# Patient Record
Sex: FEMALE | Race: WHITE | NOT HISPANIC OR LATINO | URBAN - METROPOLITAN AREA
[De-identification: names, ages, dates, MRNs, and addresses within clinical notes are randomized per-mention and may not be internally consistent; named-entity substitution may affect disease eponyms.]

---

## 2017-04-06 ENCOUNTER — ANESTHESIA - HEALTHEAST (OUTPATIENT)
Dept: SURGERY | Facility: HOSPITAL | Age: 27
End: 2017-04-06

## 2017-04-06 ASSESSMENT — MIFFLIN-ST. JEOR: SCORE: 1330.18

## 2017-04-07 ENCOUNTER — SURGERY - HEALTHEAST (OUTPATIENT)
Dept: SURGERY | Facility: HOSPITAL | Age: 27
End: 2017-04-07

## 2017-04-07 ASSESSMENT — MIFFLIN-ST. JEOR: SCORE: 1327.74

## 2017-04-26 ASSESSMENT — MIFFLIN-ST. JEOR: SCORE: 1301.95

## 2017-04-28 ENCOUNTER — ANESTHESIA - HEALTHEAST (OUTPATIENT)
Dept: SURGERY | Facility: CLINIC | Age: 27
End: 2017-04-28

## 2017-04-28 ENCOUNTER — SURGERY - HEALTHEAST (OUTPATIENT)
Dept: SURGERY | Facility: CLINIC | Age: 27
End: 2017-04-28

## 2018-03-15 ENCOUNTER — RECORDS - HEALTHEAST (OUTPATIENT)
Dept: LAB | Facility: CLINIC | Age: 28
End: 2018-03-15

## 2018-03-17 LAB — BACTERIA SPEC CULT: ABNORMAL

## 2019-04-12 ASSESSMENT — MIFFLIN-ST. JEOR: SCORE: 1367.72

## 2019-04-15 ENCOUNTER — ANESTHESIA - HEALTHEAST (OUTPATIENT)
Dept: SURGERY | Facility: AMBULATORY SURGERY CENTER | Age: 29
End: 2019-04-15

## 2019-04-16 ENCOUNTER — SURGERY - HEALTHEAST (OUTPATIENT)
Dept: SURGERY | Facility: AMBULATORY SURGERY CENTER | Age: 29
End: 2019-04-16

## 2019-04-16 ASSESSMENT — MIFFLIN-ST. JEOR: SCORE: 1367.72

## 2019-09-04 ENCOUNTER — RECORDS - HEALTHEAST (OUTPATIENT)
Dept: LAB | Facility: CLINIC | Age: 29
End: 2019-09-04

## 2019-09-06 LAB — BACTERIA SPEC CULT: ABNORMAL

## 2020-03-25 ENCOUNTER — RECORDS - HEALTHEAST (OUTPATIENT)
Dept: LAB | Facility: CLINIC | Age: 30
End: 2020-03-25

## 2020-03-25 LAB — TSH SERPL DL<=0.005 MIU/L-ACNC: 1.71 UIU/ML (ref 0.3–5)

## 2020-12-06 ENCOUNTER — RECORDS - HEALTHEAST (OUTPATIENT)
Dept: LAB | Facility: CLINIC | Age: 30
End: 2020-12-06

## 2020-12-07 LAB — BACTERIA SPEC CULT: NO GROWTH

## 2021-02-04 ENCOUNTER — RECORDS - HEALTHEAST (OUTPATIENT)
Dept: LAB | Facility: CLINIC | Age: 31
End: 2021-02-04

## 2021-02-04 LAB
ERYTHROCYTE [DISTWIDTH] IN BLOOD BY AUTOMATED COUNT: 13.4 % (ref 11–14.5)
HCT VFR BLD AUTO: 43.7 % (ref 35–47)
HGB BLD-MCNC: 14.1 G/DL (ref 12–16)
MCH RBC QN AUTO: 29.3 PG (ref 27–34)
MCHC RBC AUTO-ENTMCNC: 32.3 G/DL (ref 32–36)
MCV RBC AUTO: 91 FL (ref 80–100)
PLATELET # BLD AUTO: 338 THOU/UL (ref 140–440)
PMV BLD AUTO: 10.4 FL (ref 8.5–12.5)
RBC # BLD AUTO: 4.81 MILL/UL (ref 3.8–5.4)
WBC: 7.5 THOU/UL (ref 4–11)

## 2021-02-20 ENCOUNTER — RECORDS - HEALTHEAST (OUTPATIENT)
Dept: LAB | Facility: CLINIC | Age: 31
End: 2021-02-20

## 2021-02-20 LAB
SARS-COV-2 PCR COMMENT: NORMAL
SARS-COV-2 RNA SPEC QL NAA+PROBE: NEGATIVE
SARS-COV-2 VIRUS SPECIMEN SOURCE: NORMAL

## 2021-05-27 NOTE — ANESTHESIA CARE TRANSFER NOTE
Last vitals:   Vitals:    04/16/19 1233   BP: 119/79   Pulse: 99   Resp: 15   Temp: 36.4  C (97.6  F)   SpO2:      Pt brought to PACU on 10L facemask. Monitors applied. VSS upon arrival.  SpO2 100%    Patient's level of consciousness is drowsy  Spontaneous respirations: yes  Maintains airway independently: yes  Dentition unchanged: yes  Oropharynx: oropharynx clear of all foreign objects    QCDR Measures:  ASA# 20 - Surgical Safety Checklist: WHO surgical safety checklist completed prior to induction    PQRS# 430 - Adult PONV Prevention: 4558F - Pt received => 2 anti-emetic agents (different classes) preop & intraop  ASA# 8 - Peds PONV Prevention: NA - Not pediatric patient, not GA or 2 or more risk factors NOT present  PQRS# 424 - Hetal-op Temp Management: 4559F - At least one body temp DOCUMENTED => 35.5C or 95.9F within required timeframe  PQRS# 426 - PACU Transfer Protocol: - Transfer of care checklist used  ASA# 14 - Acute Post-op Pain: ASA14B - Patient did NOT experience pain >= 7 out of 10

## 2021-05-27 NOTE — ANESTHESIA PREPROCEDURE EVALUATION
Anesthesia Evaluation      History of anesthetic complications: PONV.     Airway   Mallampati: II  Neck ROM: full   Pulmonary - negative ROS and normal exam                          Cardiovascular - negative ROS and normal exam   Neuro/Psych    (+) depression, anxiety/panic attacks,     Endo/Other - negative ROS      GI/Hepatic/Renal - negative ROS           Dental - normal exam                        Anesthesia Plan  Planned anesthetic: general endotracheal    ASA 2     Anesthetic plan and risks discussed with: patient    Post-op plan: routine recovery

## 2021-05-27 NOTE — ANESTHESIA POSTPROCEDURE EVALUATION
Patient: Mary Morris  DIAGNOSTIC LAPAROSCOPY, POSSIBLE TREATMENT OF ENDOMETRIOSIS  Anesthesia type: general    Patient location: PACU  Last vitals:   Vitals Value Taken Time   /71 4/16/2019  2:00 PM   Temp  4/16/2019  3:33 PM   Pulse 61 4/16/2019  2:00 PM   Resp 16 4/16/2019  2:00 PM   SpO2 98 % 4/16/2019  2:00 PM     Post vital signs: stable  Level of consciousness: awake and responds to simple questions  Post-anesthesia pain: pain controlled  Post-anesthesia nausea and vomiting: no  Pulmonary: unassisted, return to baseline  Cardiovascular: stable and blood pressure at baseline  Hydration: adequate  Anesthetic events: no    QCDR Measures:  ASA# 11 - Hetal-op Cardiac Arrest: ASA11B - Patient did NOT experience unanticipated cardiac arrest  ASA# 12 - Hetal-op Mortality Rate: ASA12B - Patient did NOT die  ASA# 13 - PACU Re-Intubation Rate: ASA13B - Patient did NOT require a new airway mgmt  ASA# 10 - Composite Anes Safety: ASA10A - No serious adverse event    Additional Notes:

## 2021-05-30 VITALS — WEIGHT: 134 LBS | BODY MASS INDEX: 23.74 KG/M2 | HEIGHT: 63 IN

## 2021-05-30 VITALS — BODY MASS INDEX: 23.4 KG/M2 | HEIGHT: 64 IN | WEIGHT: 137.06 LBS

## 2021-06-02 VITALS — WEIGHT: 145 LBS | HEIGHT: 64 IN | BODY MASS INDEX: 24.75 KG/M2

## 2021-06-09 NOTE — ANESTHESIA CARE TRANSFER NOTE
Last vitals:   Vitals:    04/07/17 0801   BP: 109/73   Pulse: 74   Resp: 16   Temp: 36.4  C (97.6  F)   SpO2: 99%     Patient's level of consciousness is drowsy  Spontaneous respirations: yes  Maintains airway independently: yes  Dentition unchanged: yes  Oropharynx: oropharynx clear of all foreign objects    QCDR Measures:  ASA# 20 - Surgical Safety Checklist: ASA20A - Safety Checks Done  PQRS# 430 - Adult PONV Prevention: NA - Not adult patient, not GA or 3 or more risk factors NOT present  ASA# 8 - Peds PONV Prevention: NA - Not pediatric patient, not GA or 2 or more risk factors NOT present  PQRS# 424 - Hetal-op Temp Management: 4559F - At least one body temp DOCUMENTED => 35.5C or 95.9F within required timeframe  PQRS# 426 - PACU Transfer Protocol: - Transfer of care checklist used  ASA# 14 - Acute Post-op Pain: ASA14B - Patient did NOT experience pain >= 7 out of 10    I completed my SBAR handoff to the receiving nurse per policy and procedure.

## 2021-06-09 NOTE — ANESTHESIA PREPROCEDURE EVALUATION
Anesthesia Evaluation      Patient summary reviewed   No history of anesthetic complications     Airway   Mallampati: I  Neck ROM: full   Pulmonary - negative ROS and normal exam                          Cardiovascular - negative ROS and normal exam   Neuro/Psych - negative ROS     Endo/Other - negative ROS      GI/Hepatic/Renal - negative ROS           Dental                         Anesthesia Plan  Planned anesthetic: MAC    ASA 1     Anesthetic plan and risks discussed with: patient

## 2021-06-09 NOTE — ANESTHESIA POSTPROCEDURE EVALUATION
Patient: Mary Morris  ENDOSCOPIC ULTRASOUND  Anesthesia type: MAC    Patient location: PACU  Last vitals:   Vitals:    04/07/17 0828   BP: 109/70   Pulse: 70   Resp: 16   Temp:    SpO2: 100%     Post vital signs: stable  Level of consciousness: awake and responds to simple questions  Post-anesthesia pain: pain controlled  Post-anesthesia nausea and vomiting: no  Pulmonary: unassisted, return to baseline  Cardiovascular: stable and blood pressure at baseline  Hydration: adequate  Anesthetic events: no    QCDR Measures:  ASA# 11 - Hetal-op Cardiac Arrest: ASA11B - Patient did NOT experience unanticipated cardiac arrest  ASA# 12 - Hetal-op Mortality Rate: ASA12B - Patient did NOT die  ASA# 13 - PACU Re-Intubation Rate: ASA13B - Patient did NOT require a new airway mgmt  ASA# 10 - Composite Anes Safety: ASA10A - No serious adverse event  ASA# 38 - New Corneal Injury: ASA38A - No new exposure keratitis or corneal abrasion in PACU    Additional Notes:

## 2021-06-10 NOTE — ANESTHESIA CARE TRANSFER NOTE
Last vitals:   Vitals:    04/28/17 1146   BP: 119/66   Pulse: (!) 55   Resp: 12   Temp:    SpO2: 100%     Patient's level of consciousness is drowsy  Spontaneous respirations: yes  Maintains airway independently: yes  Dentition unchanged: yes  Oropharynx: oropharynx clear of all foreign objects    QCDR Measures:  ASA# 20 - Surgical Safety Checklist: ASA20A - Safety Checks Done  PQRS# 430 - Adult PONV Prevention: 4558F - Pt received => 2 anti-emetic agents (different classes) preop & intraop  ASA# 8 - Peds PONV Prevention: NA - Not pediatric patient, not GA or 2 or more risk factors NOT present  PQRS# 424 - Hetal-op Temp Management: 4559F - At least one body temp DOCUMENTED => 35.5C or 95.9F within required timeframe  PQRS# 426 - PACU Transfer Protocol: - Transfer of care checklist used  ASA# 14 - Acute Post-op Pain: ASA14B - Patient did NOT experience pain >= 7 out of 10    I completed my SBAR handoff to the receiving nurse per policy and procedure.

## 2021-06-10 NOTE — ANESTHESIA POSTPROCEDURE EVALUATION
Patient: Mary Morris  LAPAROSCOPIC CHOLECYSTECTOMY  Anesthesia type: general    Patient location: Phase II Recovery  Last vitals:   Vitals:    04/28/17 1255   BP:    Pulse:    Resp: 16   Temp:    SpO2:      Post vital signs: stable  Level of consciousness: awake and return to baseline  Post-anesthesia pain: pain controlled  Post-anesthesia nausea and vomiting: no  Pulmonary: unassisted, return to baseline  Cardiovascular: stable and blood pressure at baseline  Hydration: adequate  Anesthetic events: no    QCDR Measures:  ASA# 11 - Hetal-op Cardiac Arrest: ASA11B - Patient did NOT experience unanticipated cardiac arrest  ASA# 12 - Hetal-op Mortality Rate: ASA12B - Patient did NOT die  ASA# 13 - PACU Re-Intubation Rate: ASA13B - Patient did NOT require a new airway mgmt  ASA# 10 - Composite Anes Safety: ASA10A - No serious adverse event  ASA# 38 - New Corneal Injury: ASA38A - No new exposure keratitis or corneal abrasion in PACU    Additional Notes:

## 2021-06-10 NOTE — ANESTHESIA PREPROCEDURE EVALUATION
Anesthesia Evaluation      Patient summary reviewed   History of anesthetic complications: PONV.     Airway   Mallampati: I  Neck ROM: full   Pulmonary - normal exam   (+) asthma  mild,  well controlled,                          Cardiovascular - negative ROS and normal exam  Rhythm: regular  Rate: normal,         Neuro/Psych - negative ROS     Endo/Other - negative ROS      GI/Hepatic/Renal - negative ROS           Dental - normal exam                        Anesthesia Plan  Planned anesthetic: general endotracheal and total IV anesthesia    ASA 2   Induction: intravenous   Anesthetic plan and risks discussed with: patient  Anesthesia plan special considerations: antiemetics,   Post-op plan: routine recovery

## 2023-01-17 ENCOUNTER — LAB REQUISITION (OUTPATIENT)
Dept: LAB | Facility: CLINIC | Age: 33
End: 2023-01-17

## 2023-01-17 DIAGNOSIS — R30.0 DYSURIA: ICD-10-CM

## 2023-01-17 PROCEDURE — 87086 URINE CULTURE/COLONY COUNT: CPT | Performed by: FAMILY MEDICINE

## 2023-01-18 LAB — BACTERIA UR CULT: ABNORMAL

## 2023-02-24 ENCOUNTER — LAB REQUISITION (OUTPATIENT)
Dept: LAB | Facility: CLINIC | Age: 33
End: 2023-02-24

## 2023-02-24 DIAGNOSIS — N20.0 CALCULUS OF KIDNEY: ICD-10-CM

## 2023-02-24 PROCEDURE — 87086 URINE CULTURE/COLONY COUNT: CPT | Performed by: PHYSICIAN ASSISTANT

## 2023-02-26 LAB — BACTERIA UR CULT: NO GROWTH

## 2024-05-13 ENCOUNTER — LAB REQUISITION (OUTPATIENT)
Dept: LAB | Facility: CLINIC | Age: 34
End: 2024-05-13

## 2024-05-13 DIAGNOSIS — Z13.29 ENCOUNTER FOR SCREENING FOR OTHER SUSPECTED ENDOCRINE DISORDER: ICD-10-CM

## 2024-05-13 DIAGNOSIS — Z13.6 ENCOUNTER FOR SCREENING FOR CARDIOVASCULAR DISORDERS: ICD-10-CM

## 2024-05-13 DIAGNOSIS — H57.9 UNSPECIFIED DISORDER OF EYE AND ADNEXA: ICD-10-CM

## 2024-05-13 LAB — ERYTHROCYTE [SEDIMENTATION RATE] IN BLOOD BY WESTERGREN METHOD: 9 MM/HR (ref 0–20)

## 2024-05-13 PROCEDURE — 80053 COMPREHEN METABOLIC PANEL: CPT | Performed by: PHYSICIAN ASSISTANT

## 2024-05-13 PROCEDURE — 86376 MICROSOMAL ANTIBODY EACH: CPT | Performed by: PHYSICIAN ASSISTANT

## 2024-05-13 PROCEDURE — 85652 RBC SED RATE AUTOMATED: CPT | Performed by: PHYSICIAN ASSISTANT

## 2024-05-13 PROCEDURE — 84481 FREE ASSAY (FT-3): CPT | Performed by: PHYSICIAN ASSISTANT

## 2024-05-13 PROCEDURE — 80061 LIPID PANEL: CPT | Performed by: PHYSICIAN ASSISTANT

## 2024-05-13 PROCEDURE — 84480 ASSAY TRIIODOTHYRONINE (T3): CPT | Performed by: PHYSICIAN ASSISTANT

## 2024-05-13 PROCEDURE — 86140 C-REACTIVE PROTEIN: CPT | Performed by: PHYSICIAN ASSISTANT

## 2024-05-13 PROCEDURE — 84439 ASSAY OF FREE THYROXINE: CPT | Performed by: PHYSICIAN ASSISTANT

## 2024-05-13 PROCEDURE — 84443 ASSAY THYROID STIM HORMONE: CPT | Performed by: PHYSICIAN ASSISTANT

## 2024-05-13 PROCEDURE — 84436 ASSAY OF TOTAL THYROXINE: CPT | Performed by: PHYSICIAN ASSISTANT

## 2024-05-14 LAB
ALBUMIN SERPL BCG-MCNC: 4.5 G/DL (ref 3.5–5.2)
ALP SERPL-CCNC: 73 U/L (ref 40–150)
ALT SERPL W P-5'-P-CCNC: 11 U/L (ref 0–50)
ANION GAP SERPL CALCULATED.3IONS-SCNC: 10 MMOL/L (ref 7–15)
AST SERPL W P-5'-P-CCNC: 15 U/L (ref 0–45)
BILIRUB SERPL-MCNC: 0.3 MG/DL
BUN SERPL-MCNC: 10.9 MG/DL (ref 6–20)
CALCIUM SERPL-MCNC: 9.1 MG/DL (ref 8.6–10)
CHLORIDE SERPL-SCNC: 104 MMOL/L (ref 98–107)
CHOLEST SERPL-MCNC: 192 MG/DL
CREAT SERPL-MCNC: 0.81 MG/DL (ref 0.51–0.95)
CRP SERPL-MCNC: <3 MG/L
DEPRECATED HCO3 PLAS-SCNC: 26 MMOL/L (ref 22–29)
EGFRCR SERPLBLD CKD-EPI 2021: >90 ML/MIN/1.73M2
FASTING STATUS PATIENT QL REPORTED: ABNORMAL
FASTING STATUS PATIENT QL REPORTED: ABNORMAL
GLUCOSE SERPL-MCNC: 139 MG/DL (ref 70–99)
HDLC SERPL-MCNC: 65 MG/DL
LDLC SERPL CALC-MCNC: 112 MG/DL
NONHDLC SERPL-MCNC: 127 MG/DL
POTASSIUM SERPL-SCNC: 3.5 MMOL/L (ref 3.4–5.3)
PROT SERPL-MCNC: 7.3 G/DL (ref 6.4–8.3)
SODIUM SERPL-SCNC: 140 MMOL/L (ref 135–145)
T3 SERPL-MCNC: 98 NG/DL (ref 85–202)
T3FREE SERPL-MCNC: 2.8 PG/ML (ref 2–4.4)
T4 FREE SERPL-MCNC: 1.24 NG/DL (ref 0.9–1.7)
T4 SERPL-MCNC: 6.8 UG/DL (ref 4.5–11.7)
THYROPEROXIDASE AB SERPL-ACNC: <10 IU/ML
TRIGL SERPL-MCNC: 74 MG/DL
TSH SERPL DL<=0.005 MIU/L-ACNC: 0.97 UIU/ML (ref 0.3–4.2)

## 2024-06-10 ENCOUNTER — LAB REQUISITION (OUTPATIENT)
Dept: LAB | Facility: CLINIC | Age: 34
End: 2024-06-10

## 2024-06-10 DIAGNOSIS — R73.9 HYPERGLYCEMIA, UNSPECIFIED: ICD-10-CM

## 2024-06-10 LAB — HBA1C MFR BLD: 5.7 %

## 2024-06-10 PROCEDURE — 83036 HEMOGLOBIN GLYCOSYLATED A1C: CPT | Performed by: PHYSICIAN ASSISTANT

## 2024-06-24 ENCOUNTER — LAB REQUISITION (OUTPATIENT)
Dept: LAB | Facility: CLINIC | Age: 34
End: 2024-06-24

## 2024-06-24 PROCEDURE — 87081 CULTURE SCREEN ONLY: CPT | Performed by: FAMILY MEDICINE

## 2024-06-26 LAB — BACTERIA SPEC CULT: NORMAL

## 2024-09-05 ENCOUNTER — LAB REQUISITION (OUTPATIENT)
Dept: LAB | Facility: CLINIC | Age: 34
End: 2024-09-05

## 2024-09-05 DIAGNOSIS — R21 RASH AND OTHER NONSPECIFIC SKIN ERUPTION: ICD-10-CM

## 2024-09-05 DIAGNOSIS — K58.2 MIXED IRRITABLE BOWEL SYNDROME: ICD-10-CM

## 2024-09-05 PROCEDURE — 86003 ALLG SPEC IGE CRUDE XTRC EA: CPT | Performed by: PHYSICIAN ASSISTANT

## 2024-09-05 PROCEDURE — 83516 IMMUNOASSAY NONANTIBODY: CPT | Performed by: PHYSICIAN ASSISTANT

## 2024-09-05 PROCEDURE — 82784 ASSAY IGA/IGD/IGG/IGM EACH: CPT | Performed by: PHYSICIAN ASSISTANT

## 2024-09-06 LAB
GLIADIN IGA SER-ACNC: 0.5 U/ML
GLIADIN IGG SER-ACNC: <0.6 U/ML
IGA SERPL-MCNC: 239 MG/DL (ref 84–499)
TTG IGA SER-ACNC: 0.3 U/ML
TTG IGG SER-ACNC: <0.6 U/ML

## 2024-09-10 LAB
ALMOND IGE QN: <0.1 KU(A)/L
BRAZIL NUT IGE QN: <0.1 KU(A)/L
CASHEW NUT IGE QN: <0.1 KU(A)/L
CHESTNUT IGE QN: <0.1 KU(A)/L
HAZELNUT IGE QN: <0.1 KU(A)/L
PECAN/HICK NUT IGE QN: <0.1 KU(A)/L
PISTACHIO IGE QN: <0.1 KU(A)/L
WALNUT IGE QN: <0.1 KU(A)/L

## 2024-09-11 LAB
ALMOND IGE QN: <0.1 KU(A)/L
CASHEW NUT IGE QN: <0.1 KU(A)/L
CODFISH IGE QN: <0.1 KU(A)/L
COW MILK IGE QN: <0.1 KU(A)/L
EGG WHITE IGE QN: <0.1 KU(A)/L
HAZELNUT IGE QN: <0.1 KU(A)/L
IGE SERPL-ACNC: 24 KU/L (ref 0–114)
PEANUT IGE QN: <0.1 KU(A)/L
SALMON IGE QN: <0.1 KU(A)/L
SCALLOP IGE QN: <0.1 KU(A)/L
SESAME SEED IGE QN: <0.1 KU(A)/L
SHRIMP IGE QN: <0.1 KU(A)/L
SOYBEAN IGE QN: <0.1 KU(A)/L
TUNA IGE QN: <0.1 KU(A)/L
WALNUT IGE QN: <0.1 KU(A)/L
WHEAT IGE QN: <0.1 KU(A)/L